# Patient Record
Sex: FEMALE | Race: WHITE
[De-identification: names, ages, dates, MRNs, and addresses within clinical notes are randomized per-mention and may not be internally consistent; named-entity substitution may affect disease eponyms.]

---

## 2017-07-27 ENCOUNTER — RESULT REVIEW (OUTPATIENT)
Age: 47
End: 2017-07-27

## 2018-08-22 ENCOUNTER — RESULT REVIEW (OUTPATIENT)
Age: 48
End: 2018-08-22

## 2018-12-24 ENCOUNTER — TRANSCRIPTION ENCOUNTER (OUTPATIENT)
Age: 48
End: 2018-12-24

## 2019-01-21 ENCOUNTER — TRANSCRIPTION ENCOUNTER (OUTPATIENT)
Age: 49
End: 2019-01-21

## 2020-02-10 ENCOUNTER — TRANSCRIPTION ENCOUNTER (OUTPATIENT)
Age: 50
End: 2020-02-10

## 2020-04-16 ENCOUNTER — APPOINTMENT (OUTPATIENT)
Dept: PULMONOLOGY | Facility: CLINIC | Age: 50
End: 2020-04-16
Payer: COMMERCIAL

## 2020-04-16 PROBLEM — Z00.00 ENCOUNTER FOR PREVENTIVE HEALTH EXAMINATION: Status: ACTIVE | Noted: 2020-04-16

## 2020-04-16 PROCEDURE — 99204 OFFICE O/P NEW MOD 45 MIN: CPT | Mod: 95

## 2020-04-16 NOTE — CONSULT LETTER
[Dear  ___] : Dear ~SELINA, [Consult Letter:] : I had the pleasure of evaluating your patient, [unfilled]. [Please see my note below.] : Please see my note below. [Consult Closing:] : Thank you very much for allowing me to participate in the care of this patient.  If you have any questions, please do not hesitate to contact me. [Sincerely,] : Sincerely, [FreeTextEntry2] : Lavon Horne MD [FreeTextEntry3] : Tano Carias MD FCCP\par

## 2020-04-16 NOTE — HISTORY OF PRESENT ILLNESS
[Never] : never [Home] : at home, [unfilled] , at the time of the visit. [Medical Office: ([facility])___] : at the medical office located in  [TextBox_4] : This visit was provided via telehealth using real-time 2-way audio visual technology. The patient, BRITTANY OTOOLE , was located at home, [address]\[place] [address]  at the time of the visit.\par The provider, Tano Carias, was located at office [address] [place] [address] at the time of the visit. \par \par  The patient, Ms. BRITTANY OTOOLE  and Physician Tano Cardenas participated in the telehealth encounter.\par \par Verbal consent obtained by  from patient\par \par \par BRITTANY OTOOLE is a 49 year old  F referred for pulmonary evaluation for SOB\par \par Patient saw PMD for SOB since mid march. Saw PMD 3/23 had EKG and CXR ant told fine. Gave patient proair without response.\par Told if no improvement to see pulmonary.\par \par Feels cannot get a full breath. No cough, wheeze or chest congestion. No chest pain.\par \par Past pulmonary history. N\par Occupational Exposure.  WTC 1 block away when towers came down. Went back one month after.\par Family history of pulmonary disease. N\par Recent travel N\par Pets CAT not allergic\par \par CXR done prohalth radiology\par \par No nocturnal issues. \par No definitive STOLL. No change with exertion.

## 2020-04-16 NOTE — PROCEDURE
[FreeTextEntry1] : CXR reviewed\par \par \par Study Date: 03- 09:42 \par  \par \par  \par  XR Chest 2 View:3/27/2020 9:46 AM\par  \par PA AND LATERAL CHEST:\par  \par CLINICAL HISTORY: Chest pain\par  \par Prior examination: None\par  \par The cardiac and mediastinal silhouette are within normal limits. \par There are no focal infiltrates.  There are no pleural effusions. \par There are minimal degenerative changes in the thoracic spine. \par  \par IMPRESSION:\par  \par No acute pulmonary disease\par  \par  \par Electronically signed by Chet Sánchez MD 3/27/2020 10:05 AM \par Ref. Physician: APOORVA GUAN MD\par [address] [facility]  [address]\[place] [address]\par \par

## 2020-04-17 ENCOUNTER — LABORATORY RESULT (OUTPATIENT)
Age: 50
End: 2020-04-17

## 2020-04-17 ENCOUNTER — APPOINTMENT (OUTPATIENT)
Dept: PULMONOLOGY | Facility: CLINIC | Age: 50
End: 2020-04-17
Payer: COMMERCIAL

## 2020-04-17 VITALS
DIASTOLIC BLOOD PRESSURE: 78 MMHG | TEMPERATURE: 98.5 F | SYSTOLIC BLOOD PRESSURE: 118 MMHG | HEIGHT: 62 IN | WEIGHT: 150 LBS | HEART RATE: 79 BPM | RESPIRATION RATE: 12 BRPM | BODY MASS INDEX: 27.6 KG/M2 | OXYGEN SATURATION: 95 %

## 2020-04-17 PROCEDURE — 99213 OFFICE O/P EST LOW 20 MIN: CPT | Mod: 25

## 2020-04-17 PROCEDURE — 36415 COLL VENOUS BLD VENIPUNCTURE: CPT

## 2020-04-17 PROCEDURE — 71046 X-RAY EXAM CHEST 2 VIEWS: CPT

## 2020-04-17 PROCEDURE — 94010 BREATHING CAPACITY TEST: CPT

## 2020-04-17 NOTE — DISCUSSION/SUMMARY
[FreeTextEntry1] : No definitive evidence of pulmonary disease to explain patients symptom complex.\par May be manifestation of anxiety, stress.

## 2020-04-17 NOTE — PROCEDURE
[FreeTextEntry1] : 04/17/2020\par Pulmonary function testing\par FEV1, FVC, and FEV1/FVC are within normal limits.

## 2020-04-17 NOTE — PHYSICAL EXAM
[No Acute Distress] : no acute distress [Normal Oropharynx] : normal oropharynx [Normal Appearance] : normal appearance [Supple] : supple [No Neck Mass] : no neck mass [No JVD] : no jvd [Normal S1, S2] : normal s1, s2 [No Murmurs] : no murmurs [Clear to Auscultation Bilaterally] : clear to auscultation bilaterally [Normal to Percussion] : normal to percussion [Benign] : benign [Not Tender] : not tender [No HSM] : no hsm [No Clubbing] : no clubbing [No Cyanosis] : no cyanosis

## 2020-04-17 NOTE — ASSESSMENT
[FreeTextEntry1] : Labs drawn in office today\par F/U PMD for further evaluation\par F/U in few weeks if persistent or progressive symptoms

## 2020-04-17 NOTE — CONSULT LETTER
[Dear  ___] : Dear ~SELINA, [Consult Letter:] : I had the pleasure of evaluating your patient, [unfilled]. [Please see my note below.] : Please see my note below. [Consult Closing:] : Thank you very much for allowing me to participate in the care of this patient.  If you have any questions, please do not hesitate to contact me. [Sincerely,] : Sincerely, [FreeTextEntry2] : Lavon Horne MD\par  [FreeTextEntry3] : Tano Carias MD FCCP\par

## 2020-04-21 LAB
25(OH)D3 SERPL-MCNC: 24.4 NG/ML
ALBUMIN SERPL ELPH-MCNC: 4.4 G/DL
ALP BLD-CCNC: 80 U/L
ALT SERPL-CCNC: 25 U/L
ANION GAP SERPL CALC-SCNC: 11 MMOL/L
AST SERPL-CCNC: 20 U/L
BASOPHILS # BLD AUTO: 0.03 K/UL
BASOPHILS NFR BLD AUTO: 0.5 %
BILIRUB DIRECT SERPL-MCNC: 0.2 MG/DL
BILIRUB INDIRECT SERPL-MCNC: 0.6 MG/DL
BILIRUB SERPL-MCNC: 0.8 MG/DL
BUN SERPL-MCNC: 12 MG/DL
CALCIUM SERPL-MCNC: 9.7 MG/DL
CHLORIDE SERPL-SCNC: 106 MMOL/L
CHOLEST SERPL-MCNC: 195 MG/DL
CHOLEST/HDLC SERPL: 3.5 RATIO
CO2 SERPL-SCNC: 25 MMOL/L
CREAT SERPL-MCNC: 0.69 MG/DL
DEPRECATED D DIMER PPP IA-ACNC: <150 NG/ML DDU
EOSINOPHIL # BLD AUTO: 0.11 K/UL
EOSINOPHIL NFR BLD AUTO: 1.9 %
ESTIMATED AVERAGE GLUCOSE: 108 MG/DL
GLUCOSE SERPL-MCNC: 112 MG/DL
HBA1C MFR BLD HPLC: 5.4 %
HCT VFR BLD CALC: 40.6 %
HDLC SERPL-MCNC: 56 MG/DL
HGB BLD-MCNC: 13.2 G/DL
IMM GRANULOCYTES NFR BLD AUTO: 0.2 %
LDLC SERPL CALC-MCNC: 113 MG/DL
LYMPHOCYTES # BLD AUTO: 1.64 K/UL
LYMPHOCYTES NFR BLD AUTO: 29 %
MAN DIFF?: NORMAL
MCHC RBC-ENTMCNC: 28 PG
MCHC RBC-ENTMCNC: 32.5 GM/DL
MCV RBC AUTO: 86.2 FL
MONOCYTES # BLD AUTO: 0.36 K/UL
MONOCYTES NFR BLD AUTO: 6.4 %
NEUTROPHILS # BLD AUTO: 3.51 K/UL
NEUTROPHILS NFR BLD AUTO: 62 %
PLATELET # BLD AUTO: 295 K/UL
POTASSIUM SERPL-SCNC: 4 MMOL/L
PROT SERPL-MCNC: 7.2 G/DL
RBC # BLD: 4.71 M/UL
RBC # FLD: 13 %
SODIUM SERPL-SCNC: 142 MMOL/L
T3 SERPL-MCNC: 101 NG/DL
T3RU NFR SERPL: 1.1 TBI
T4 FREE SERPL-MCNC: 1.1 NG/DL
T4 SERPL-MCNC: 7.3 UG/DL
TRIGL SERPL-MCNC: 127 MG/DL
TSH SERPL-ACNC: 1.75 UIU/ML
WBC # FLD AUTO: 5.66 K/UL

## 2020-10-13 DIAGNOSIS — Z78.9 OTHER SPECIFIED HEALTH STATUS: ICD-10-CM

## 2020-10-13 RX ORDER — ALBUTEROL SULFATE 90 UG/1
108 (90 BASE) AEROSOL, METERED RESPIRATORY (INHALATION)
Refills: 0 | Status: DISCONTINUED | COMMUNITY
End: 2020-10-13

## 2020-10-13 RX ORDER — FAMOTIDINE 40 MG/1
TABLET, FILM COATED ORAL
Refills: 0 | Status: DISCONTINUED | COMMUNITY
End: 2020-10-13

## 2020-10-15 ENCOUNTER — NON-APPOINTMENT (OUTPATIENT)
Age: 50
End: 2020-10-15

## 2020-10-15 ENCOUNTER — APPOINTMENT (OUTPATIENT)
Dept: CARDIOLOGY | Facility: CLINIC | Age: 50
End: 2020-10-15
Payer: COMMERCIAL

## 2020-10-15 VITALS
DIASTOLIC BLOOD PRESSURE: 79 MMHG | TEMPERATURE: 97.3 F | WEIGHT: 149 LBS | BODY MASS INDEX: 27.42 KG/M2 | HEART RATE: 76 BPM | HEIGHT: 62 IN | OXYGEN SATURATION: 97 % | SYSTOLIC BLOOD PRESSURE: 122 MMHG

## 2020-10-15 PROCEDURE — 93000 ELECTROCARDIOGRAM COMPLETE: CPT

## 2020-10-15 PROCEDURE — 99204 OFFICE O/P NEW MOD 45 MIN: CPT

## 2020-10-15 NOTE — PHYSICAL EXAM
[General Appearance - Well Developed] : well developed [Normal Appearance] : normal appearance [Well Groomed] : well groomed [General Appearance - Well Nourished] : well nourished [No Deformities] : no deformities [Normal Oral Mucosa] : normal oral mucosa [General Appearance - In No Acute Distress] : no acute distress [No Oral Pallor] : no oral pallor [No Oral Cyanosis] : no oral cyanosis [Normal Jugular Venous V Waves Present] : normal jugular venous V waves present [Normal Jugular Venous A Waves Present] : normal jugular venous A waves present [Heart Rate And Rhythm] : heart rate and rhythm were normal [No Jugular Venous Miller A Waves] : no jugular venous miller A waves [Heart Sounds] : normal S1 and S2 [Murmurs] : no murmurs present [Respiration, Rhythm And Depth] : normal respiratory rhythm and effort [Exaggerated Use Of Accessory Muscles For Inspiration] : no accessory muscle use [Auscultation Breath Sounds / Voice Sounds] : lungs were clear to auscultation bilaterally [Abdomen Soft] : soft [Abdomen Tenderness] : non-tender [Abdomen Mass (___ Cm)] : no abdominal mass palpated [Abnormal Walk] : normal gait [Gait - Sufficient For Exercise Testing] : the gait was sufficient for exercise testing [Nail Clubbing] : no clubbing of the fingernails [Cyanosis, Localized] : no localized cyanosis [Petechial Hemorrhages (___cm)] : no petechial hemorrhages [] : no ischemic changes

## 2020-10-15 NOTE — HISTORY OF PRESENT ILLNESS
[FreeTextEntry1] : 49 year old woman with no significant past medical problems, on no medications who comes in for evaluation of left sided chest pain and shortness of breath since March 2020. Pain is left sided, is constant and really never goes away. Dyspnea can happen with exertion. She states it is worse when she is busy and active. Worse with stairs. \par Mom with history of MI when she was 60, HTN HLD DM\par Dad with HLD\par She has 2 children (10 and 8) with no issues with preeclampsia\par EKG reviewed and normal\par COVID AB negative

## 2020-10-15 NOTE — DISCUSSION/SUMMARY
[FreeTextEntry1] : 49 year old woman with dyspnea and cough with left sided chest pain\par -check stress echo\par -cardionet monitor\par -fu in 6 weeks

## 2021-03-15 ENCOUNTER — TRANSCRIPTION ENCOUNTER (OUTPATIENT)
Age: 51
End: 2021-03-15

## 2021-07-16 ENCOUNTER — TRANSCRIPTION ENCOUNTER (OUTPATIENT)
Age: 51
End: 2021-07-16

## 2021-09-21 ENCOUNTER — TRANSCRIPTION ENCOUNTER (OUTPATIENT)
Age: 51
End: 2021-09-21

## 2021-11-22 ENCOUNTER — APPOINTMENT (OUTPATIENT)
Dept: PULMONOLOGY | Facility: CLINIC | Age: 51
End: 2021-11-22

## 2022-11-15 ENCOUNTER — APPOINTMENT (OUTPATIENT)
Dept: PULMONOLOGY | Facility: CLINIC | Age: 52
End: 2022-11-15

## 2022-11-15 VITALS — DIASTOLIC BLOOD PRESSURE: 76 MMHG | HEART RATE: 80 BPM | OXYGEN SATURATION: 97 % | SYSTOLIC BLOOD PRESSURE: 113 MMHG

## 2022-11-15 DIAGNOSIS — R07.9 CHEST PAIN, UNSPECIFIED: ICD-10-CM

## 2022-11-15 DIAGNOSIS — R06.02 SHORTNESS OF BREATH: ICD-10-CM

## 2022-11-15 LAB — POCT - HEMOGLOBIN (HGB), QUANTITATIVE, TRANSCUTANEOUS: 13.5

## 2022-11-15 PROCEDURE — 94727 GAS DIL/WSHOT DETER LNG VOL: CPT

## 2022-11-15 PROCEDURE — 99214 OFFICE O/P EST MOD 30 MIN: CPT | Mod: 25

## 2022-11-15 PROCEDURE — 94010 BREATHING CAPACITY TEST: CPT

## 2022-11-15 PROCEDURE — 88738 HGB QUANT TRANSCUTANEOUS: CPT

## 2022-11-15 PROCEDURE — 99204 OFFICE O/P NEW MOD 45 MIN: CPT | Mod: 25

## 2022-11-15 PROCEDURE — 71046 X-RAY EXAM CHEST 2 VIEWS: CPT

## 2022-11-15 PROCEDURE — 94729 DIFFUSING CAPACITY: CPT

## 2022-11-15 PROCEDURE — ZZZZZ: CPT

## 2022-11-15 RX ORDER — OMEPRAZOLE 40 MG/1
40 CAPSULE, DELAYED RELEASE ORAL
Refills: 0 | Status: ACTIVE | COMMUNITY

## 2022-11-15 NOTE — HISTORY OF PRESENT ILLNESS
[Never] : never [TextBox_4] : BRITTANY OTOOLE is a 51 year old  F referred for pulmonary evaluation for\par \par Since 8/22 difficulty with SOB\par Saw PMD and had CAT yesterday. Done [employer] [facility].  CT of abdomen and pelvis.\par Has pressure under left rib cage increases with eating.\par Hard to catch breath usually after eating.\par No cough, wheezing or chest congestion. \par Feels need to take a deep breath.\par \par Not doing exercise.\par No definitive exercise limitation. \par Occ burning in middle of chest. \par \par Past pulmonary history. 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 N\par Recent travel N\par Pets Cats not allergic. \par \par On Omeprazole 2 weeks with positive GI effect but no help with SOB. \par No recent chest imaging.

## 2022-11-15 NOTE — ASSESSMENT
[FreeTextEntry1] : D Dimer\par Labs drawn in office today\par Consider CT or CTA pending above if no etiology for symptom complex ascertained.\par Following up with GI and medicine.

## 2022-11-15 NOTE — DISCUSSION/SUMMARY
[FreeTextEntry1] : No definitive evidence of pulmonary disease to explain patients symptom complex.\par Shortness of breath may be a manifestation of anxiety and stress related to other symptom complex.\par Patient presently being worked up for abdominal and GI issues.\par

## 2022-11-15 NOTE — PROCEDURE
[FreeTextEntry1] : 11/15/2022\par Pulmonary function testing\par FEV1, FVC, and FEV1/FVC are within normal limits. TLC and subdivisions are normal. RV/TLC ratio is normal. Single breath diffusion capacity is normal. \par There is no significant change in flow rates compared to 2020.\par \par CT abdomen results pending.  Did review lung windows and no evidence of parenchymal disease at lung bases.

## 2022-11-16 LAB — DEPRECATED D DIMER PPP IA-ACNC: <150 NG/ML DDU

## 2022-11-21 ENCOUNTER — NON-APPOINTMENT (OUTPATIENT)
Age: 52
End: 2022-11-21

## 2023-02-25 ENCOUNTER — NON-APPOINTMENT (OUTPATIENT)
Age: 53
End: 2023-02-25

## 2023-11-04 ENCOUNTER — NON-APPOINTMENT (OUTPATIENT)
Age: 53
End: 2023-11-04

## 2025-07-14 ENCOUNTER — NON-APPOINTMENT (OUTPATIENT)
Age: 55
End: 2025-07-14

## 2025-07-14 ENCOUNTER — APPOINTMENT (OUTPATIENT)
Dept: PULMONOLOGY | Facility: CLINIC | Age: 55
End: 2025-07-14
Payer: COMMERCIAL

## 2025-07-14 VITALS
HEIGHT: 62 IN | WEIGHT: 154 LBS | BODY MASS INDEX: 28.34 KG/M2 | OXYGEN SATURATION: 93 % | RESPIRATION RATE: 16 BRPM | SYSTOLIC BLOOD PRESSURE: 108 MMHG | HEART RATE: 82 BPM | DIASTOLIC BLOOD PRESSURE: 74 MMHG

## 2025-07-14 PROBLEM — Z76.89 SLEEP CONCERN: Status: ACTIVE | Noted: 2025-07-14

## 2025-07-14 LAB — POCT - HEMOGLOBIN (HGB), QUANTITATIVE, TRANSCUTANEOUS: 13.2

## 2025-07-14 PROCEDURE — ZZZZZ: CPT

## 2025-07-14 PROCEDURE — 94010 BREATHING CAPACITY TEST: CPT

## 2025-07-14 PROCEDURE — 99215 OFFICE O/P EST HI 40 MIN: CPT | Mod: 25

## 2025-07-14 PROCEDURE — 71046 X-RAY EXAM CHEST 2 VIEWS: CPT

## 2025-07-14 PROCEDURE — 88738 HGB QUANT TRANSCUTANEOUS: CPT

## 2025-07-14 PROCEDURE — 94618 PULMONARY STRESS TESTING: CPT

## 2025-07-14 PROCEDURE — 94729 DIFFUSING CAPACITY: CPT

## 2025-07-14 PROCEDURE — 94727 GAS DIL/WSHOT DETER LNG VOL: CPT

## 2025-07-15 LAB — DEPRECATED D DIMER PPP IA-ACNC: 164 NG/ML DDU

## 2025-08-20 ENCOUNTER — APPOINTMENT (OUTPATIENT)
Dept: PULMONOLOGY | Facility: CLINIC | Age: 55
End: 2025-08-20
Payer: COMMERCIAL

## 2025-08-20 ENCOUNTER — TRANSCRIPTION ENCOUNTER (OUTPATIENT)
Age: 55
End: 2025-08-20

## 2025-08-20 PROCEDURE — 95800 SLP STDY UNATTENDED: CPT

## 2025-08-21 PROCEDURE — 95800 SLP STDY UNATTENDED: CPT
